# Patient Record
Sex: MALE | Race: WHITE | NOT HISPANIC OR LATINO
[De-identification: names, ages, dates, MRNs, and addresses within clinical notes are randomized per-mention and may not be internally consistent; named-entity substitution may affect disease eponyms.]

---

## 2021-12-16 PROBLEM — Z00.00 ENCOUNTER FOR PREVENTIVE HEALTH EXAMINATION: Status: ACTIVE | Noted: 2021-12-16

## 2022-03-14 ENCOUNTER — APPOINTMENT (OUTPATIENT)
Dept: UROLOGY | Facility: CLINIC | Age: 67
End: 2022-03-14
Payer: MEDICARE

## 2022-03-14 VITALS — HEIGHT: 72 IN | WEIGHT: 136 LBS | BODY MASS INDEX: 18.42 KG/M2

## 2022-03-14 DIAGNOSIS — Z72.89 OTHER PROBLEMS RELATED TO LIFESTYLE: ICD-10-CM

## 2022-03-14 DIAGNOSIS — R82.81 PYURIA: ICD-10-CM

## 2022-03-14 DIAGNOSIS — F17.200 NICOTINE DEPENDENCE, UNSPECIFIED, UNCOMPLICATED: ICD-10-CM

## 2022-03-14 PROCEDURE — 99204 OFFICE O/P NEW MOD 45 MIN: CPT

## 2022-03-14 NOTE — ADDENDUM
[FreeTextEntry1] : Patient's note was transcribed with the assistance of a medical scribe under the supervision of Dr. Brambila.\par I, Dr. Brambila, have reviewed the patient's chart and agree that it aligns with my medical decisions.\par Dontrell Rodas, our scribe, also served as a chaperone for physical examination purposes.\par \par \par

## 2022-03-14 NOTE — HISTORY OF PRESENT ILLNESS
[FreeTextEntry1] : IRASEMA BOYCE is a 66 year old male  who presents for consultation for elevated PSA. \par \par Pt reports he is not urinary symptoms whatsoever.. Although reports nocturia 2-3x. \par Pt reports he has been following his PSA levels with his PCP. \par Denies gross hematuria, dysuria or associated symptoms. \par \par Denies  PMH including previous kidney stones, recurrent UTIs. \par Family History: No  malignancies\par Social History: Current Cigar Smoker. former  with [employer] \par \par Labs: \par 12/2021: \par UA: pyuria // no microhematuria// nitrates=negative \par Cr= 0.94\par eGFR=84\par PSA=7.4 \par \par 5/2021:\par PSA= 6.6 \par \par AUA: 2 \par Quality of life= delighted. \par \par \par

## 2022-03-14 NOTE — ASSESSMENT
[FreeTextEntry1] : IRASEMA BOYCE is a 66 year old male who presents for consultation for elevated PSA. No risk factors.\par \par Plan: \par UCx, UA. reassess pyuria, assess for infection\par MRI prostate \par fu after\par \par

## 2022-03-14 NOTE — LETTER HEADER
[FreeTextEntry3] : Deandre Brambila MD\par Director of Robotic and Minimally Invasive Urologic Surgery\par \par [facility]\par Division of Urology\par [address]\par [address]\[place] [address]\par Tel [phone]\par Fax [phone]\par \par

## 2022-03-14 NOTE — PHYSICAL EXAM
[General Appearance - Well Developed] : well developed [General Appearance - Well Nourished] : well nourished [Normal Appearance] : normal appearance [Well Groomed] : well groomed [General Appearance - In No Acute Distress] : no acute distress [Edema] : no peripheral edema [Respiration, Rhythm And Depth] : normal respiratory rhythm and effort [Exaggerated Use Of Accessory Muscles For Inspiration] : no accessory muscle use [Abdomen Soft] : soft [Abdomen Tenderness] : non-tender [Costovertebral Angle Tenderness] : no ~M costovertebral angle tenderness [Urethral Meatus] : meatus normal [Urinary Bladder Findings] : the bladder was normal on palpation [Scrotum] : the scrotum was normal [Testes Mass (___cm)] : there were no testicular masses [No Prostate Nodules] : no prostate nodules [Prostate Size ___ gm] : prostate size [unfilled] gm [Normal Station and Gait] : the gait and station were normal for the patient's age [] : no rash [No Focal Deficits] : no focal deficits [Oriented To Time, Place, And Person] : oriented to person, place, and time [Affect] : the affect was normal [Mood] : the mood was normal [Not Anxious] : not anxious [No Palpable Adenopathy] : no palpable adenopathy [FreeTextEntry1] : prostate exam normal

## 2022-03-14 NOTE — LETTER BODY
[Dear  ___] : Dear  [unfilled], [FreeTextEntry2] : Dr Jamie Johnson [FreeTextEntry3] : Deandre Brambila MD\par Director of Robotic and Minimally Invasive Urologic Surgery\par [facility]\par

## 2022-04-19 ENCOUNTER — NON-APPOINTMENT (OUTPATIENT)
Age: 67
End: 2022-04-19

## 2022-04-25 ENCOUNTER — APPOINTMENT (OUTPATIENT)
Dept: UROLOGY | Facility: CLINIC | Age: 67
End: 2022-04-25
Payer: MEDICARE

## 2022-04-25 VITALS — HEIGHT: 71 IN | BODY MASS INDEX: 18.9 KG/M2 | WEIGHT: 135 LBS

## 2022-04-25 PROCEDURE — 99214 OFFICE O/P EST MOD 30 MIN: CPT

## 2022-04-25 RX ORDER — SULFAMETHOXAZOLE AND TRIMETHOPRIM 800; 160 MG/1; MG/1
800-160 TABLET ORAL TWICE DAILY
Qty: 14 | Refills: 0 | Status: ACTIVE | COMMUNITY
Start: 2022-04-25 | End: 1900-01-01

## 2022-04-25 NOTE — HISTORY OF PRESENT ILLNESS
[FreeTextEntry1] : IRASEMA BOYCE is a 66 year old male who presents for consultation for elevated PSA. No risk factors.\par \par Pt reports he is doing well overall. Denies any dysuria, gross hematuria or urinary frequency or urgency. \par Reports in the past he has had asymptomatic bacteriuria as per his pcp. \par \par 4/2022: \par UCx= E.Coli\par UA:WBC=20-40 // bacteria=many. \par PSA=6.5\par \par Previously: \par Pt reports he is not urinary symptoms whatsoever.. Although reports nocturia 2-3x. \par Pt reports he has been following his PSA levels with his PCP. \par Denies gross hematuria, dysuria or associated symptoms. \par \par Denies  PMH including previous kidney stones, recurrent UTIs. \par Family History: No  malignancies\par Social History: Current Cigar Smoker. former  with [employer] \par \par Labs: \par 12/2021: \par UA: pyuria // no microhematuria// nitrates=negative \par Cr= 0.94\par eGFR=84\par PSA=7.4 \par \par 5/2021:\par PSA= 6.6 \par \par AUA: 2 \par Quality of life= delighted. \par \par \par

## 2022-04-25 NOTE — ASSESSMENT
[FreeTextEntry1] : IRASEMA BOYCE is a 66 year old male who presents for consultation for elevated PSA. No risk factors.\par With asymptomatic bacteriuria and reported prior hx asx bacteriuria.\par \par Plan: \par Start Bactrim treatment for a week.\par the week after completing the ABx treatment PSA testing.  \par MRI prostate will obtain anyway given pt has had elevated psa at least 1 year\par f/u with the results \par future can obtain renal sono assess cause of bacteriruria, as pt is interested

## 2022-05-24 ENCOUNTER — OUTPATIENT (OUTPATIENT)
Dept: OUTPATIENT SERVICES | Facility: HOSPITAL | Age: 67
LOS: 1 days | Discharge: HOME | End: 2022-05-24
Payer: MEDICARE

## 2022-05-24 DIAGNOSIS — R97.20 ELEVATED PROSTATE SPECIFIC ANTIGEN [PSA]: ICD-10-CM

## 2022-05-24 PROCEDURE — 72197 MRI PELVIS W/O & W/DYE: CPT | Mod: 26

## 2022-05-26 ENCOUNTER — NON-APPOINTMENT (OUTPATIENT)
Age: 67
End: 2022-05-26

## 2022-06-09 ENCOUNTER — APPOINTMENT (OUTPATIENT)
Dept: UROLOGY | Facility: CLINIC | Age: 67
End: 2022-06-09
Payer: MEDICARE

## 2022-06-09 PROCEDURE — 99214 OFFICE O/P EST MOD 30 MIN: CPT

## 2022-06-09 NOTE — ASSESSMENT
[FreeTextEntry1] : IRASEMA BOYCE is a 66 year old male who presents for consultation for elevated PSA. No risk factors.\par With asymptomatic bacteriuria and reported prior hx asx bacteriuria.\par \par Plan: \par Recommended prostate biopsy given the patient has an elevated PSA density however patient prefers to hold off for now.  He will follow-up in 6 weeks with a PSA prior given the PSA has been going down.  If it is not improved we will recommend a biopsy again\par \par

## 2022-06-09 NOTE — HISTORY OF PRESENT ILLNESS
[FreeTextEntry1] : IRASEMA BOYCE is a 66 year old male who presents for consultation for elevated PSA. No risk factors.\par \par Pt reports he is doing well overall. Denies any dysuria, gross hematuria or urinary frequency or urgency. \par Patient states that his urine culture most recently was negative after taking antibiotics.  He reports improvement in his urinary symptoms.\par \par PSA 5.9 Free psa 8%\par PSAD 0.231\par \par MRI prostate images visualized demonstrate no suspicious lesions, PI-RADS 1 and prostate volume 25.5 g\par \par 4/2022: \par UCx= E.Coli\par UA:WBC=20-40 // bacteria=many. \par PSA=6.5\par \par Previously: \par Pt reports he is not urinary symptoms whatsoever.. Although reports nocturia 2-3x. \par Pt reports he has been following his PSA levels with his PCP. \par Denies gross hematuria, dysuria or associated symptoms. \par \par Denies  PMH including previous kidney stones, recurrent UTIs. \par Family History: No  malignancies\par Social History: Current Cigar Smoker. former  with [employer] \par \par Labs: \par 12/2021: \par UA: pyuria // no microhematuria// nitrates=negative \par Cr= 0.94\par eGFR=84\par PSA=7.4 \par \par 5/2021:\par PSA= 6.6 \par \par AUA: 2 \par Quality of life= delighted. \par \par \par

## 2022-07-21 ENCOUNTER — NON-APPOINTMENT (OUTPATIENT)
Age: 67
End: 2022-07-21

## 2022-07-21 ENCOUNTER — APPOINTMENT (OUTPATIENT)
Dept: UROLOGY | Facility: CLINIC | Age: 67
End: 2022-07-21

## 2022-07-21 LAB
BILIRUB UR QL STRIP: NORMAL
CLARITY UR: NORMAL
COLLECTION METHOD: NORMAL
GLUCOSE UR-MCNC: NORMAL
HCG UR QL: 1 EU/DL
HGB UR QL STRIP.AUTO: NORMAL
KETONES UR-MCNC: NORMAL
LEUKOCYTE ESTERASE UR QL STRIP: NORMAL
PH UR STRIP: 7
PROT UR STRIP-MCNC: NORMAL
SP GR UR STRIP: 1.02

## 2022-07-21 PROCEDURE — 99214 OFFICE O/P EST MOD 30 MIN: CPT

## 2022-07-21 PROCEDURE — 81003 URINALYSIS AUTO W/O SCOPE: CPT | Mod: QW

## 2022-07-21 RX ORDER — AMOXICILLIN 500 MG/1
500 CAPSULE ORAL
Qty: 30 | Refills: 0 | Status: ACTIVE | COMMUNITY
Start: 2022-02-03

## 2022-07-21 RX ORDER — CARBIDOPA AND LEVODOPA 25; 250 MG/1; MG/1
25-250 TABLET, ORALLY DISINTEGRATING ORAL
Qty: 90 | Refills: 0 | Status: ACTIVE | COMMUNITY
Start: 2022-04-14

## 2022-07-21 NOTE — PHYSICAL EXAM
[General Appearance - Well Developed] : well developed [General Appearance - Well Nourished] : well nourished [Normal Appearance] : normal appearance [Well Groomed] : well groomed [General Appearance - In No Acute Distress] : no acute distress [Abdomen Soft] : soft [Abdomen Tenderness] : non-tender [Costovertebral Angle Tenderness] : no ~M costovertebral angle tenderness [Scrotum] : the scrotum was normal [Edema] : no peripheral edema [] : no respiratory distress [Respiration, Rhythm And Depth] : normal respiratory rhythm and effort [Exaggerated Use Of Accessory Muscles For Inspiration] : no accessory muscle use [Oriented To Time, Place, And Person] : oriented to person, place, and time [Affect] : the affect was normal [Mood] : the mood was normal [Not Anxious] : not anxious [Normal Station and Gait] : the gait and station were normal for the patient's age [No Focal Deficits] : no focal deficits [No Palpable Adenopathy] : no palpable adenopathy

## 2022-07-22 NOTE — ASSESSMENT
[FreeTextEntry1] : IRASEMA BOYCE is a 66 year old male who presents for f/u of an elevated PSA. No risk factors.\par With asymptomatic bacteriuria - s/p 7 days of bactrim- urine neg for infection now,\par no PVR demostrated\par \par Plan: \par Recommended prostate biopsy given the patient has an elevated PSA density- pt agrees\par will schedule a TP biopsy in the OR \par \par \par The patient understands that PSA is a marker of cancer risk but does not diagnose cancer. The patient also understands that there are a number of benign conditions including UTI, BPH, certain activities and prostatitis that will increase PSA in the absence of cancer. Unfortunately the only way to definitively diagnose cancer is with a prostate biopsy. We discussed the method of performing biopsy (transrectal or transperineal in the office) and the risks (bleeding, infection/sepsis, urinary retention, ED). We perform targeted fusion prostate biopsies in the operating room under anesthesia. \par \par \par

## 2022-07-22 NOTE — HISTORY OF PRESENT ILLNESS
[FreeTextEntry1] : IRASEMA BOYCE is a 66 year old male who presents for f/u of an elevated PSA. No risk factors.\par no FH prostate ca\par \par Pt reports he is doing well overall. Denies any dysuria, gross hematuria or urinary frequency or urgency. \par Patient reports an improvement in LUTS- specifically nocturia since taking bactrim for a + urine culture \par \par urine dip today 7/21/22- neg\par bladder scan today 7/21/22-  36ml\par \par PSA- 7/2022- 6.5  %free 6   PSAD- 0.26\par PSA- 5/2022- 5.9 Free psa 8%  PSAD 0.231\par \par MRI prostate-  5/24/22  images visualized demonstrate no suspicious lesions, PI-RADS 1 and prostate volume 25g\par \par 4/2022: \par UCx= E.Coli\par UA:WBC=20-40 // bacteria=many. \par PSA=6.5\par \par Previously: \par Denies  PMH including previous kidney stones, recurrent UTIs. \par Family History: No  malignancies\par Social History: Current Cigar Smoker. former  with [employer] \par \par Labs: \par 12/2021: \par UA: pyuria // no microhematuria// nitrates=negative \par Cr= 0.94\par eGFR=84\par PSA=7.4 \par \par 5/2021:\par PSA= 6.6 \par \par AUA: 2 \par Quality of life= delighted. \par \par \par

## 2022-08-25 ENCOUNTER — RESULT REVIEW (OUTPATIENT)
Age: 67
End: 2022-08-25

## 2022-08-25 ENCOUNTER — OUTPATIENT (OUTPATIENT)
Dept: OUTPATIENT SERVICES | Facility: HOSPITAL | Age: 67
LOS: 1 days | Discharge: HOME | End: 2022-08-25

## 2022-08-25 VITALS
SYSTOLIC BLOOD PRESSURE: 110 MMHG | HEART RATE: 80 BPM | WEIGHT: 126.99 LBS | OXYGEN SATURATION: 99 % | RESPIRATION RATE: 16 BRPM | HEIGHT: 71 IN | DIASTOLIC BLOOD PRESSURE: 60 MMHG | TEMPERATURE: 98 F

## 2022-08-25 DIAGNOSIS — Z01.818 ENCOUNTER FOR OTHER PREPROCEDURAL EXAMINATION: ICD-10-CM

## 2022-08-25 DIAGNOSIS — R97.20 ELEVATED PROSTATE SPECIFIC ANTIGEN [PSA]: ICD-10-CM

## 2022-08-25 DIAGNOSIS — Z86.69 PERSONAL HISTORY OF OTHER DISEASES OF THE NERVOUS SYSTEM AND SENSE ORGANS: Chronic | ICD-10-CM

## 2022-08-25 LAB
ALBUMIN SERPL ELPH-MCNC: 4.3 G/DL — SIGNIFICANT CHANGE UP (ref 3.5–5.2)
ALP SERPL-CCNC: 65 U/L — SIGNIFICANT CHANGE UP (ref 30–115)
ALT FLD-CCNC: 7 U/L — SIGNIFICANT CHANGE UP (ref 0–41)
ANION GAP SERPL CALC-SCNC: 10 MMOL/L — SIGNIFICANT CHANGE UP (ref 7–14)
APTT BLD: 30 SEC — SIGNIFICANT CHANGE UP (ref 27–39.2)
AST SERPL-CCNC: 14 U/L — SIGNIFICANT CHANGE UP (ref 0–41)
BASOPHILS # BLD AUTO: 0.03 K/UL — SIGNIFICANT CHANGE UP (ref 0–0.2)
BASOPHILS NFR BLD AUTO: 0.4 % — SIGNIFICANT CHANGE UP (ref 0–1)
BILIRUB SERPL-MCNC: 1.2 MG/DL — SIGNIFICANT CHANGE UP (ref 0.2–1.2)
BUN SERPL-MCNC: 29 MG/DL — HIGH (ref 10–20)
CALCIUM SERPL-MCNC: 9.3 MG/DL — SIGNIFICANT CHANGE UP (ref 8.5–10.1)
CHLORIDE SERPL-SCNC: 102 MMOL/L — SIGNIFICANT CHANGE UP (ref 98–110)
CO2 SERPL-SCNC: 30 MMOL/L — SIGNIFICANT CHANGE UP (ref 17–32)
CREAT SERPL-MCNC: 1.3 MG/DL — SIGNIFICANT CHANGE UP (ref 0.7–1.5)
EGFR: 60 ML/MIN/1.73M2 — SIGNIFICANT CHANGE UP
EOSINOPHIL # BLD AUTO: 0.07 K/UL — SIGNIFICANT CHANGE UP (ref 0–0.7)
EOSINOPHIL NFR BLD AUTO: 0.9 % — SIGNIFICANT CHANGE UP (ref 0–8)
GLUCOSE SERPL-MCNC: 101 MG/DL — HIGH (ref 70–99)
HCT VFR BLD CALC: 37.5 % — LOW (ref 42–52)
HGB BLD-MCNC: 12 G/DL — LOW (ref 14–18)
IMM GRANULOCYTES NFR BLD AUTO: 0.3 % — SIGNIFICANT CHANGE UP (ref 0.1–0.3)
INR BLD: 1.09 RATIO — SIGNIFICANT CHANGE UP (ref 0.65–1.3)
LYMPHOCYTES # BLD AUTO: 1.03 K/UL — LOW (ref 1.2–3.4)
LYMPHOCYTES # BLD AUTO: 13.4 % — LOW (ref 20.5–51.1)
MCHC RBC-ENTMCNC: 23.3 PG — LOW (ref 27–31)
MCHC RBC-ENTMCNC: 32 G/DL — SIGNIFICANT CHANGE UP (ref 32–37)
MCV RBC AUTO: 72.7 FL — LOW (ref 80–94)
MONOCYTES # BLD AUTO: 0.8 K/UL — HIGH (ref 0.1–0.6)
MONOCYTES NFR BLD AUTO: 10.4 % — HIGH (ref 1.7–9.3)
NEUTROPHILS # BLD AUTO: 5.72 K/UL — SIGNIFICANT CHANGE UP (ref 1.4–6.5)
NEUTROPHILS NFR BLD AUTO: 74.6 % — SIGNIFICANT CHANGE UP (ref 42.2–75.2)
NRBC # BLD: 0 /100 WBCS — SIGNIFICANT CHANGE UP (ref 0–0)
PLATELET # BLD AUTO: 204 K/UL — SIGNIFICANT CHANGE UP (ref 130–400)
POTASSIUM SERPL-MCNC: 4.6 MMOL/L — SIGNIFICANT CHANGE UP (ref 3.5–5)
POTASSIUM SERPL-SCNC: 4.6 MMOL/L — SIGNIFICANT CHANGE UP (ref 3.5–5)
PROT SERPL-MCNC: 6.3 G/DL — SIGNIFICANT CHANGE UP (ref 6–8)
PROTHROM AB SERPL-ACNC: 12.5 SEC — SIGNIFICANT CHANGE UP (ref 9.95–12.87)
RBC # BLD: 5.16 M/UL — SIGNIFICANT CHANGE UP (ref 4.7–6.1)
RBC # FLD: 13.7 % — SIGNIFICANT CHANGE UP (ref 11.5–14.5)
SODIUM SERPL-SCNC: 142 MMOL/L — SIGNIFICANT CHANGE UP (ref 135–146)
WBC # BLD: 7.67 K/UL — SIGNIFICANT CHANGE UP (ref 4.8–10.8)
WBC # FLD AUTO: 7.67 K/UL — SIGNIFICANT CHANGE UP (ref 4.8–10.8)

## 2022-08-25 PROCEDURE — 71046 X-RAY EXAM CHEST 2 VIEWS: CPT | Mod: 26

## 2022-08-25 PROCEDURE — 93010 ELECTROCARDIOGRAM REPORT: CPT

## 2022-08-25 NOTE — H&P PST ADULT - NSANTHOSAYNRD_GEN_A_CORE
No. LESTER screening performed.  STOP BANG Legend: 0-2 = LOW Risk; 3-4 = INTERMEDIATE Risk; 5-8 = HIGH Risk

## 2022-08-25 NOTE — H&P PST ADULT - ADDITIONAL PE
tremors -LT hand when writing, kyphosis, LTD ext neck, B/L LE -hyperpigmented scars - B/L LE -atrophy

## 2022-08-25 NOTE — H&P PST ADULT - NSICDXPASTMEDICALHX_GEN_ALL_CORE_FT
PAST MEDICAL HISTORY:  Dizziness with nausea -2/3 times/yr    Mini stroke winter 2021    Tremor arm- LT hand and shaking of  head -laying down at night    Vertigo

## 2022-08-25 NOTE — H&P PST ADULT - HISTORY OF PRESENT ILLNESS
67yr old male reports freq urination -PSA elevated from prior -is scheduled for TRANSPERINEAL PROSTATE BIOPSY. Denies COVID S/S. Recd 3 doses vaccine. Verbalized understanding of COVID prevention measures. Exercise collin 5-6 blocks.  Anesthesia Alert  NO--Difficult Airway  NO--History of neck surgery or radiation  YES--Limited ROM of neck  NO--History of Malignant hyperthermia  NO--Personal or family history of Pseudocholinesterase deficiency  NO--Prior Anesthesia Complication  NO--Latex Allergy  NO--Loose teeth  NO--History of Rheumatoid Arthritis  NO--LESTER  No Bleeding risk  NO--Other_____

## 2022-08-26 LAB
APPEARANCE UR: CLEAR — SIGNIFICANT CHANGE UP
BACTERIA # UR AUTO: NEGATIVE — SIGNIFICANT CHANGE UP
BILIRUB UR-MCNC: ABNORMAL
COLOR SPEC: YELLOW — SIGNIFICANT CHANGE UP
CULTURE RESULTS: SIGNIFICANT CHANGE UP
DIFF PNL FLD: NEGATIVE — SIGNIFICANT CHANGE UP
EPI CELLS # UR: 0 /HPF — SIGNIFICANT CHANGE UP (ref 0–5)
GLUCOSE UR QL: SIGNIFICANT CHANGE UP
HYALINE CASTS # UR AUTO: 0 /LPF — SIGNIFICANT CHANGE UP (ref 0–7)
KETONES UR-MCNC: SIGNIFICANT CHANGE UP
LEUKOCYTE ESTERASE UR-ACNC: NEGATIVE — SIGNIFICANT CHANGE UP
NITRITE UR-MCNC: NEGATIVE — SIGNIFICANT CHANGE UP
PH UR: 6 — SIGNIFICANT CHANGE UP (ref 5–8)
PROT UR-MCNC: ABNORMAL
RBC CASTS # UR COMP ASSIST: 3 /HPF — SIGNIFICANT CHANGE UP (ref 0–4)
SP GR SPEC: 1.03 — SIGNIFICANT CHANGE UP (ref 1.01–1.03)
SPECIMEN SOURCE: SIGNIFICANT CHANGE UP
UROBILINOGEN FLD QL: ABNORMAL
WBC UR QL: 1 /HPF — SIGNIFICANT CHANGE UP (ref 0–5)

## 2022-09-01 ENCOUNTER — NON-APPOINTMENT (OUTPATIENT)
Age: 67
End: 2022-09-01

## 2022-09-04 ENCOUNTER — LABORATORY RESULT (OUTPATIENT)
Age: 67
End: 2022-09-04

## 2022-09-07 ENCOUNTER — APPOINTMENT (OUTPATIENT)
Dept: UROLOGY | Facility: HOSPITAL | Age: 67
End: 2022-09-07

## 2022-09-07 ENCOUNTER — TRANSCRIPTION ENCOUNTER (OUTPATIENT)
Age: 67
End: 2022-09-07

## 2022-09-07 ENCOUNTER — OUTPATIENT (OUTPATIENT)
Dept: OUTPATIENT SERVICES | Facility: HOSPITAL | Age: 67
LOS: 1 days | Discharge: HOME | End: 2022-09-07

## 2022-09-07 ENCOUNTER — RESULT REVIEW (OUTPATIENT)
Age: 67
End: 2022-09-07

## 2022-09-07 VITALS
WEIGHT: 130.07 LBS | SYSTOLIC BLOOD PRESSURE: 120 MMHG | RESPIRATION RATE: 17 BRPM | DIASTOLIC BLOOD PRESSURE: 56 MMHG | HEART RATE: 80 BPM | TEMPERATURE: 97 F | HEIGHT: 71 IN | OXYGEN SATURATION: 99 %

## 2022-09-07 VITALS — RESPIRATION RATE: 17 BRPM | SYSTOLIC BLOOD PRESSURE: 122 MMHG | HEART RATE: 65 BPM | DIASTOLIC BLOOD PRESSURE: 57 MMHG

## 2022-09-07 DIAGNOSIS — Z86.69 PERSONAL HISTORY OF OTHER DISEASES OF THE NERVOUS SYSTEM AND SENSE ORGANS: Chronic | ICD-10-CM

## 2022-09-07 PROCEDURE — 93975 VASCULAR STUDY: CPT | Mod: 26

## 2022-09-07 PROCEDURE — 55700: CPT

## 2022-09-07 PROCEDURE — 45990 SURG DX EXAM ANORECTAL: CPT

## 2022-09-07 PROCEDURE — 76872 US TRANSRECTAL: CPT | Mod: 26

## 2022-09-07 PROCEDURE — G0416: CPT | Mod: 26

## 2022-09-07 RX ORDER — OXYCODONE AND ACETAMINOPHEN 5; 325 MG/1; MG/1
1 TABLET ORAL EVERY 4 HOURS
Refills: 0 | Status: DISCONTINUED | OUTPATIENT
Start: 2022-09-07 | End: 2022-09-07

## 2022-09-07 RX ORDER — ONDANSETRON 8 MG/1
4 TABLET, FILM COATED ORAL ONCE
Refills: 0 | Status: DISCONTINUED | OUTPATIENT
Start: 2022-09-07 | End: 2022-09-21

## 2022-09-07 RX ORDER — ASPIRIN/CALCIUM CARB/MAGNESIUM 324 MG
1 TABLET ORAL
Qty: 0 | Refills: 0 | DISCHARGE

## 2022-09-07 RX ORDER — SODIUM CHLORIDE 9 MG/ML
1000 INJECTION, SOLUTION INTRAVENOUS
Refills: 0 | Status: DISCONTINUED | OUTPATIENT
Start: 2022-09-07 | End: 2022-09-21

## 2022-09-07 RX ORDER — ATORVASTATIN CALCIUM 80 MG/1
1 TABLET, FILM COATED ORAL
Qty: 0 | Refills: 0 | DISCHARGE

## 2022-09-07 RX ORDER — CARBIDOPA AND LEVODOPA 25; 100 MG/1; MG/1
1 TABLET ORAL
Qty: 0 | Refills: 0 | DISCHARGE

## 2022-09-07 NOTE — CHART NOTE - NSCHARTNOTEFT_GEN_A_CORE
PACU ANESTHESIA ADMISSION NOTE      Procedure: Prostate needle biopsy      Post op diagnosis:      ____  Intubated  TV:______       Rate: ______      FiO2: ______    _x___  Patent Airway    __x__  Full return of protective reflexes    __x__  Full recovery from anesthesia / back to baseline     Vitals:   T: 97-9          R:  16                BP:  112/76                Sat: 99                  P: 65      Mental Status:  x____ Awake   x_____ Alert   _____ Drowsy   _____ Sedated    Nausea/Vomiting:  ____ NO  ______Yes,   See Post - Op Orders          Pain Scale (0-10):  _____    Treatment: ____ None    ____ See Post - Op/PCA Orders    Post - Operative Fluids:   ____ Oral   ____ See Post - Op Orders    Plan: Discharge:  x ____Home       _____Floor     _____Critical Care    _____  Other:_________________    Comments:

## 2022-09-07 NOTE — ASU DISCHARGE PLAN (ADULT/PEDIATRIC) - ASU DC SPECIAL INSTRUCTIONSFT
Today you underwent a transperineal prostate biopsy. Blood in the urine, stool, and ejaculate is possible for weeks. If you have a fever >100.4 degrees or if you are unable to urinate, you should visit the emergency room immediately. Dr Brambila's office will call you for a follow up appointment in 2 weeks.   Please apply the provided bacitracin for on the perineum, twice per day until the tube runs out.  The dressing can come off tomorrow.   You can use tylenol and/or ibuprofen over the counter as needed for pain.

## 2022-09-07 NOTE — ASU PATIENT PROFILE, ADULT - NS PRO AD ANY ON CHART
Insurance Authorization for admission:   Phone call placed to [address]  Phone number: [phone]     Spoke to Oral K     4 days approved  Level of care: IP  Review on TBD  Authorization # pending admission     EVS (Eligibility Verification System) called - [phone]  Automated system indicates: Eligible for [employer] for Transportation:    Phone call placed to **  Phone number **  Spoke to **     Authorization #: ** No

## 2022-09-07 NOTE — ASU PATIENT PROFILE, ADULT - FALL HARM RISK - UNIVERSAL INTERVENTIONS
Bed in lowest position, wheels locked, appropriate side rails in place/Call bell, personal items and telephone in reach/Instruct patient to call for assistance before getting out of bed or chair/Non-slip footwear when patient is out of bed/[place] to call system/Physically safe environment - no spills, clutter or unnecessary equipment/Purposeful Proactive Rounding/Room/bathroom lighting operational, light cord in reach

## 2022-09-07 NOTE — ASU PATIENT PROFILE, ADULT - FALL HARM RISK - ATTEMPT OOB
To be assessed. Attempted with 2 person assistance however pt. unable to perform secondary to LE weakness.
No

## 2022-09-09 PROBLEM — R25.1 TREMOR, UNSPECIFIED: Chronic | Status: ACTIVE | Noted: 2022-08-25

## 2022-09-09 PROBLEM — R42 DIZZINESS AND GIDDINESS: Chronic | Status: ACTIVE | Noted: 2022-08-25

## 2022-09-09 PROBLEM — G45.9 TRANSIENT CEREBRAL ISCHEMIC ATTACK, UNSPECIFIED: Chronic | Status: ACTIVE | Noted: 2022-08-25

## 2022-09-12 LAB — SURGICAL PATHOLOGY STUDY: SIGNIFICANT CHANGE UP

## 2022-09-14 DIAGNOSIS — R97.20 ELEVATED PROSTATE SPECIFIC ANTIGEN [PSA]: ICD-10-CM

## 2022-09-14 DIAGNOSIS — N41.1 CHRONIC PROSTATITIS: ICD-10-CM

## 2022-09-14 DIAGNOSIS — Z79.82 LONG TERM (CURRENT) USE OF ASPIRIN: ICD-10-CM

## 2022-09-14 DIAGNOSIS — N40.0 BENIGN PROSTATIC HYPERPLASIA WITHOUT LOWER URINARY TRACT SYMPTOMS: ICD-10-CM

## 2022-09-14 DIAGNOSIS — F17.290 NICOTINE DEPENDENCE, OTHER TOBACCO PRODUCT, UNCOMPLICATED: ICD-10-CM

## 2022-09-14 DIAGNOSIS — C61 MALIGNANT NEOPLASM OF PROSTATE: ICD-10-CM

## 2022-09-16 ENCOUNTER — NON-APPOINTMENT (OUTPATIENT)
Age: 67
End: 2022-09-16

## 2022-09-22 ENCOUNTER — APPOINTMENT (OUTPATIENT)
Dept: UROLOGY | Facility: CLINIC | Age: 67
End: 2022-09-22

## 2022-09-22 VITALS
WEIGHT: 135 LBS | HEART RATE: 75 BPM | SYSTOLIC BLOOD PRESSURE: 94 MMHG | RESPIRATION RATE: 16 BRPM | HEIGHT: 71 IN | BODY MASS INDEX: 18.9 KG/M2 | DIASTOLIC BLOOD PRESSURE: 51 MMHG

## 2022-09-22 PROCEDURE — 99214 OFFICE O/P EST MOD 30 MIN: CPT

## 2022-09-22 NOTE — ADDENDUM
[FreeTextEntry1] : Patient's note was transcribed with the assistance of a medical scribe under the supervision of Dr. Brambila.\par I, Dr. Brambila, have reviewed the patient's chart and agree that it aligns with my medical decisions.\par Maura Ramos, our scribe, also served as a chaperone for physical examination purposes.\par \par \par

## 2022-09-22 NOTE — ASSESSMENT
[FreeTextEntry1] : IRASEMA BOYCE is a 67 year old male who presents for f/u of an elevated PSA. No risk factors.\par With asymptomatic bacteriuria - s/p 7 days of bactrim- urine neg for infection now,no PVR demonstrated w persistently elevated PSA and MRI negative w high PSAD sp TP prostate biopsy (non-targeted) \par Pathology Sariah 3+3 prostate cancer 3 of 12 cores positive up to 90% of core\par \par We discussed various options and patient elects to start active surveillance.  Declined surgery or radiation.  I also offered patient a second opinion and we printed out the pathology.\par \par \par The natural history of this disease was explained to the patient at length and the treatment options discussed including radical prostatectomy by open or robotic-assisted laparoscopic approach, external-beam radiotherapy, brachytherapy, and watchful waiting, including the probability of success and complications associated with these approaches.\par \par With respect to AS/watchful waiting, we discussed the difficulties of estimating the extent of disease preoperatively, the risk of cancer progression, and risk that salvage might not be possible with progression. \par  However, the favorable long-term outcomes of active surveillance in appropriately selected patients was conveyed.\par

## 2022-09-22 NOTE — HISTORY OF PRESENT ILLNESS
[FreeTextEntry1] : IRASEMA BOYCE is a 67 year old male who presents for f/u of an elevated PSA. No risk factors.\par With asymptomatic bacteriuria - s/p 7 days of bactrim- urine neg for infection now,no PVR demonstrated.\par \par Pt reports feeling fine after the biopsy . Notes no blood in stool or gross hematuria or dysuria . Denies fever and chills but c/o hematospermia. \par \par pathology 09/07/22 - 3/12 cores positive for adenocarcinoma , Sariah score 6 , grade group 1 , involving up to \par 90% of the core \par \par Previously \par urine dip today 7/21/22- neg\par bladder scan today 7/21/22-  36ml\par \par PSA- 7/2022- 6.5  %free 6   PSAD- 0.26\par PSA- 5/2022- 5.9 Free psa 8%  PSAD 0.231\par \par MRI prostate-  5/24/22  images demonstrate no suspicious lesions, PI-RADS 1 and prostate volume 25g\par \par 4/2022: \par UCx= E.Coli\par UA:WBC=20-40 // bacteria=many. \par PSA=6.5\par \par Previously: \par Denies  PMH including previous kidney stones, recurrent UTIs. \par Family History: No  malignancies\par Social History: Current Cigar Smoker. former  with [employer] \par \par Labs: \par 12/2021: \par UA: pyuria // no microhematuria// nitrates=negative \par Cr= 0.94\par eGFR=84\par PSA=7.4 \par \par 5/2021:\par PSA= 6.6 \par \par AUA: 2 \par Quality of life= delighted. \par \par \par

## 2022-11-17 ENCOUNTER — APPOINTMENT (OUTPATIENT)
Dept: NEUROLOGY | Facility: CLINIC | Age: 67
End: 2022-11-17

## 2022-11-17 VITALS
BODY MASS INDEX: 18.9 KG/M2 | HEART RATE: 77 BPM | WEIGHT: 135 LBS | DIASTOLIC BLOOD PRESSURE: 77 MMHG | SYSTOLIC BLOOD PRESSURE: 119 MMHG | HEIGHT: 71 IN

## 2022-11-17 PROCEDURE — 99213 OFFICE O/P EST LOW 20 MIN: CPT

## 2022-11-18 NOTE — HISTORY OF PRESENT ILLNESS
[FreeTextEntry1] : Mr. Solomon presents today as a 67 year old man for neurological follow-up. 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 He denies any new or evolving symptoms. This has been going on for approximately 3 years. Tremor do not occur all the time, only occurs when he is writing. His handwriting is legible and is not very bothersome. He denies any gait issues or cognitive issues. Since last encounter, patient continues to have head twitching that mostly occurs at night. He denies it being voluntary or an urge to do. \par \par Since last encounter, patient reports trialing Carbidopa levodopa which offered 50% improvement with regards to the head twitching though no improvement of hand tremors. He takes the medication 2x a day. He denies any side effects to medication.

## 2022-11-18 NOTE — ASSESSMENT
[FreeTextEntry1] : Mr. Solomon presents today as a 67 year old man for neurological follow-up. 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 Since last encounter, patient reports trialing Carbidopa levodopa which offered 50% improvement with regards to the head twitching. He wishes to cut down to once a day which I advised is okay, though if symptoms worsen he may return to twice a day. In regards to hand tremors patient will see a movement disorder specialist. \par \par Dale BARNES, attest that this documentation has been prepared under the direction and in the presence of Provider Rosaline Felipe \par \par Thank you for allowing me to assist in the management of this patient.\par \par \par Bret Fletcher DO\bernadine Board Certified, Neurology

## 2023-03-20 ENCOUNTER — APPOINTMENT (OUTPATIENT)
Dept: UROLOGY | Facility: CLINIC | Age: 68
End: 2023-03-20
Payer: MEDICARE

## 2023-03-20 VITALS
SYSTOLIC BLOOD PRESSURE: 120 MMHG | HEART RATE: 70 BPM | HEIGHT: 71 IN | DIASTOLIC BLOOD PRESSURE: 80 MMHG | WEIGHT: 135 LBS | BODY MASS INDEX: 18.9 KG/M2

## 2023-03-20 PROCEDURE — 99213 OFFICE O/P EST LOW 20 MIN: CPT

## 2023-03-20 NOTE — ASSESSMENT
[FreeTextEntry1] : IRASEMA BOYCE is a 67 year old male who presents for f/u of an elevated PSA. No risk factors.\par With asymptomatic bacteriuria - s/p 7 days of bactrim- urine neg for infection now,no PVR demonstrated w persistently elevated PSA and MRI negative w high PSAD sp TP prostate biopsy (non-targeted) \par Pathology Sariah 3+3 prostate cancer 3 of 12 cores positive up to 90% of core\par \par PSA stable\par Patient elects to continue active surveillance versus definitive management w surg or xrt\par \par Plan \par - RTO in 6 months with PSA. \par JEANNE at next visit \par MRI 12 mo

## 2023-03-20 NOTE — HISTORY OF PRESENT ILLNESS
[FreeTextEntry1] : IRASEMA BOYCE is a 67 year old male who presents for f/u of an elevated PSA. No risk factors.\par With asymptomatic bacteriuria - s/p 7 days of bactrim- urine neg for infection now,no PVR demonstrated w persistently elevated PSA and MRI negative w high PSAD sp TP prostate biopsy (non-targeted) 09/22\par Pathology:Marcus 3+3 prostate cancer 3 of 12 cores positive up to 90% of core.\par \par Pt has had stable LUTS following the biopsy and reports no significant changes. He reports strong stream and nocturia 0-1x. He denies any gross hematuria , dysuria or associated symptoms.  \par PSA 03/14/23 - 6.8 \par \par previously \par pathology 09/07/22 - 3/12 cores positive for adenocarcinoma , Marcus score 6 , grade group 1 , involving up to \par 90% of the core \par \par urine dip today 7/21/22- neg\par bladder scan today 7/21/22-  36ml\par \par PSA- 7/2022- 6.5  %free 6   PSAD- 0.26\par PSA- 5/2022- 5.9 Free psa 8%  PSAD 0.231\par \par MRI prostate-  5/24/22  images demonstrate no suspicious lesions, PI-RADS 1 and prostate volume 25g\par \par 4/2022: \par UCx= E.Coli\par UA:WBC=20-40 // bacteria=many. \par PSA=6.5\par \par Previously: \par Denies  PMH including previous kidney stones, recurrent UTIs. \par Family History: No  malignancies\par Social History: Current Cigar Smoker. former  with [employer] \par \par Labs: \par 12/2021: \par UA: pyuria // no microhematuria// nitrates=negative \par Cr= 0.94\par eGFR=84\par PSA=7.4 \par \par 5/2021:\par PSA= 6.6 \par \par AUA: 2 \par Quality of life= delighted. \par \par \par

## 2023-09-21 ENCOUNTER — APPOINTMENT (OUTPATIENT)
Dept: UROLOGY | Facility: CLINIC | Age: 68
End: 2023-09-21
Payer: MEDICARE

## 2023-09-21 VITALS
HEART RATE: 96 BPM | WEIGHT: 135 LBS | HEIGHT: 71 IN | SYSTOLIC BLOOD PRESSURE: 118 MMHG | DIASTOLIC BLOOD PRESSURE: 66 MMHG | BODY MASS INDEX: 18.9 KG/M2

## 2023-09-21 DIAGNOSIS — R97.20 ELEVATED PROSTATE, SPECIFIC ANTIGEN [PSA]: ICD-10-CM

## 2023-09-21 PROCEDURE — 99214 OFFICE O/P EST MOD 30 MIN: CPT

## 2023-09-22 PROBLEM — R97.20 ELEVATED PROSTATE SPECIFIC ANTIGEN (PSA): Status: ACTIVE | Noted: 2022-03-14

## 2024-02-27 ENCOUNTER — APPOINTMENT (OUTPATIENT)
Dept: NEUROLOGY | Facility: CLINIC | Age: 69
End: 2024-02-27
Payer: MEDICARE

## 2024-02-27 DIAGNOSIS — G25.0 ESSENTIAL TREMOR: ICD-10-CM

## 2024-02-27 DIAGNOSIS — R42 DIZZINESS AND GIDDINESS: ICD-10-CM

## 2024-02-27 PROCEDURE — 99213 OFFICE O/P EST LOW 20 MIN: CPT

## 2024-02-27 RX ORDER — ATORVASTATIN CALCIUM 10 MG/1
10 TABLET, FILM COATED ORAL
Qty: 90 | Refills: 3 | Status: ACTIVE | COMMUNITY
Start: 2022-06-06 | End: 1900-01-01

## 2024-02-27 RX ORDER — CARBIDOPA AND LEVODOPA 25; 250 MG/1; MG/1
25-250 TABLET ORAL 3 TIMES DAILY
Qty: 270 | Refills: 3 | Status: ACTIVE | COMMUNITY
Start: 2024-02-27 | End: 1900-01-01

## 2024-02-27 RX ORDER — ASPIRIN 81 MG/1
81 TABLET, CHEWABLE ORAL DAILY
Qty: 90 | Refills: 3 | Status: ACTIVE | COMMUNITY
Start: 2022-05-02 | End: 1900-01-01

## 2024-02-29 PROBLEM — G25.0 ESSENTIAL TREMOR: Status: ACTIVE | Noted: 2022-11-17

## 2024-02-29 NOTE — PHYSICAL EXAM
[Place] : oriented to place [Person] : oriented to person [Concentration Intact] : normal concentrating ability [Time] : oriented to time [Naming Objects] : no difficulty naming common objects [Visual Intact] : visual attention was ~T not ~L decreased [Repeating Phrases] : no difficulty repeating a phrase [Fluency] : fluency intact [Writing A Sentence] : no difficulty writing a sentence [Reading] : reading intact [Comprehension] : comprehension intact [Past History] : adequate knowledge of personal past history [Cranial Nerves Optic (II)] : visual acuity intact bilaterally,  visual fields full to confrontation, pupils equal round and reactive to light [Cranial Nerves Trigeminal (V)] : facial sensation intact symmetrically [Cranial Nerves Oculomotor (III)] : extraocular motion intact [Cranial Nerves Vestibulocochlear (VIII)] : hearing was intact bilaterally [Cranial Nerves Facial (VII)] : face symmetrical [Cranial Nerves Glossopharyngeal (IX)] : tongue and palate midline [Cranial Nerves Hypoglossal (XII)] : there was no tongue deviation with protrusion [Cranial Nerves Accessory (XI - Cranial And Spinal)] : head turning and shoulder shrug symmetric [Motor Tone] : muscle tone was normal in all four extremities [Motor Strength] : muscle strength was normal in all four extremities [No Muscle Atrophy] : normal bulk in all four extremities [Sensation Tactile Decrease] : light touch was intact [Balance] : balance was intact [Abnormal Walk] : normal gait [2+] : Ankle jerk left 2+ [Past-pointing] : there was no past-pointing [Plantar Reflex Right Only] : normal on the right [Tremor] : no tremor present [Plantar Reflex Left Only] : normal on the left

## 2024-02-29 NOTE — REASON FOR VISIT
SUBJECTIVE:   Sabi Corado is a 40 year old female presenting with a chief complaint of   Chief Complaint   Patient presents with    Rectal/perineal Pain     Bump 5 days ago, seems like skin tissue is there now, having chills and fever since yesterday, unsure if getting a cold/virus also at the same time or is it from the rectal problem? After further discussion she has been exposed to COVID at work      5 days ago: Started as a 'ball' and painful, 3 days of no more pain but is bleeding- red and no odor  -next to rectum  -is constantly bleeding  -was painful to sit and walk 5 days ago, now no pain  -no pain with bowel movements  -tried: ibuprofen, warm baths    Fever started yesterday and is worse today, congestion, dry cough  -tried: Nyquil, Dayquil  -no covid tests at home    Main concern is the fever    Patient denies lightheadedness and dizziness. Reports having history of hemoglobin around 8-9 after giving birth but has not seen a PCP in 3 years to monitor anemia. Does not take iron.    She is a new patient of [facility].      Review of Systems   Constitutional:  Positive for chills and fever.   HENT:  Positive for congestion. Negative for ear pain and sore throat.    Eyes:  Positive for pain.   Respiratory:  Positive for cough. Negative for shortness of breath.    Cardiovascular:  Negative for chest pain.   Gastrointestinal:  Positive for anal bleeding and nausea. Negative for abdominal pain, blood in stool, constipation, diarrhea and vomiting.   Genitourinary:  Negative for dysuria.   Skin:  Negative for rash.   Neurological:  Negative for numbness and headaches.       History reviewed. No pertinent past medical history.  History reviewed. No pertinent family history.  Current Outpatient Medications   Medication Sig Dispense Refill    hydrocortisone, Perianal, (HYDROCORTISONE) 2.5 % cream Place rectally 2 times daily as needed for hemorrhoids 28 g 0     Social History     Tobacco Use    Smoking  status: Never    Smokeless tobacco: Never   Substance Use Topics    Alcohol use: Not on file       OBJECTIVE  /63 (BP Location: Left arm, Patient Position: Sitting, Cuff Size: Adult Large)   Pulse 101   Temp (!) 102.6  F (39.2  C) (Tympanic)   Wt 65.3 kg (144 lb)   LMP 09/17/2023 (Approximate)   SpO2 98%   BMI 25.51 kg/m      Physical Exam  Vitals and nursing note reviewed.   Constitutional:       General: She is not in acute distress.     Appearance: She is normal weight. She is ill-appearing. She is not toxic-appearing or diaphoretic.   HENT:      Head: Normocephalic and atraumatic.      Right Ear: Tympanic membrane, ear canal and external ear normal.      Left Ear: Tympanic membrane, ear canal and external ear normal.      Mouth/Throat:      Mouth: Mucous membranes are moist.      Pharynx: Oropharynx is clear. No oropharyngeal exudate or posterior oropharyngeal erythema.   Eyes:      Conjunctiva/sclera: Conjunctivae normal.   Cardiovascular:      Rate and Rhythm: Regular rhythm. Tachycardia present.      Heart sounds: Normal heart sounds.   Pulmonary:      Effort: Pulmonary effort is normal.      Breath sounds: Normal breath sounds.   Genitourinary:     Comments: External hemorrhoid on 3oclock position if anus is clock and 12oclock is towards bellybutton  Musculoskeletal:      Cervical back: Normal range of motion.   Skin:     General: Skin is warm and dry.   Neurological:      General: No focal deficit present.      Mental Status: She is alert and oriented to person, place, and time. Mental status is at baseline.   Psychiatric:         Mood and Affect: Mood normal.         Behavior: Behavior normal.         Thought Content: Thought content normal.         Judgment: Judgment normal.         Labs:  Results for orders placed or performed in visit on 09/28/23 (from the past 24 hour(s))   CBC with platelets and differential    Narrative    The following orders were created for panel order CBC with  platelets and differential.  Procedure                               Abnormality         Status                     ---------                               -----------         ------                     CBC with platelets and d...[[id number]]  Abnormal            Final result                 Please view results for these tests on the individual orders.   CBC with platelets and differential   Result Value Ref Range    WBC Count 5.8 4.0 - 11.0 10e3/uL    RBC Count 4.10 3.80 - 5.20 10e6/uL    Hemoglobin 7.6 (LL) 11.7 - 15.7 g/dL    Hematocrit 27.1 (L) 35.0 - 47.0 %    MCV 66 (L) 78 - 100 fL    MCH 18.5 (L) 26.5 - 33.0 pg    MCHC 28.0 (L) 31.5 - 36.5 g/dL    RDW 19.1 (H) 10.0 - 15.0 %    Platelet Count 282 150 - 450 10e3/uL    % Neutrophils 74 %    % Lymphocytes 12 %    % Monocytes 14 %    % Eosinophils 0 %    % Basophils 0 %    % Immature Granulocytes 0 %    Absolute Neutrophils 4.3 1.6 - 8.3 10e3/uL    Absolute Lymphocytes 0.7 (L) 0.8 - 5.3 10e3/uL    Absolute Monocytes 0.8 0.0 - 1.3 10e3/uL    Absolute Eosinophils 0.0 0.0 - 0.7 10e3/uL    Absolute Basophils 0.0 0.0 - 0.2 10e3/uL    Absolute Immature Granulocytes 0.0 <=0.4 10e3/uL       X-Ray was not done.    ASSESSMENT:      ICD-10-CM    1. External hemorrhoids  K64.4 Adult GI  Referral - Consult Only     hydrocortisone, Perianal, (HYDROCORTISONE) 2.5 % cream      2. Fever, unspecified fever cause  R50.9 CBC with platelets and differential     CBC with platelets and differential      3. Anemia, unspecified type  D64.9            Medical Decision Making:    Differential Diagnosis:  URI Adult/Peds:  Bronchitis-viral, Influenza, Pneumonia, Sinusitis, Viral syndrome, and Viral upper respiratory illness, covid    Anal bleeding: hemorrhoid, anal fissure, abscess    Serious Comorbid Conditions:  Adult:  None    PLAN:    Hemorrhoid: Prescribed hydrocortisone. Made GI referral. Reassured that the bleeding should stop soon. Recommended fiber and hydration    Anemia:  CBC ordered to evaluate for infectious causes of fever, incidental finding of hemoglobin at 7.6. Patient denies dizziness and lightheadedness but does have a fever. No CBC for comparison of her baseline hemoglobin and due to patient's fever, discussed options of going to ED for potential transfusion or following up with PCP within a week. Patient elected to go to ED at this time.     Fever: Pending Covid test. CBC normal except for low hemoglobin, no bacterial infection is etiology for fever.     Followup:    If not improving or if condition worsens, follow up with your Primary Care Provider, If not improving or if conditions worsens over the next 12-24 hours, go to the Emergency Department    There are no Patient Instructions on file for this visit.    TANISHA Perkins Student      [Follow-Up: _____] : a [unfilled] follow-up visit [FreeTextEntry1] : follow up Mini Stroke.

## 2024-02-29 NOTE — ASSESSMENT
[FreeTextEntry1] : Tremor/past specific dystonia - Response to carbidopa levodopa, medication refill  History of CVA - Continue with aspirin/atorvastatin for secondary stroke prevention   I, Keli Flores, Attest that this documentation has been prepared under the direction and in the presence of Provider Bret Fletcher DO  Thank You for letting me assist in the management of this patient.   Bret Fletcher DO Board Certified, Neurology

## 2024-02-29 NOTE — HISTORY OF PRESENT ILLNESS
[FreeTextEntry1] : Mr. IRASEMA SOLOMON returns for a follow up. His prior history, physical have been reviewed. He reports no change since last visit. He has been seeing us for Dystocia in the left hand which he was last seen 11/17/2022. Since then, the tremors have significantly improved. He has treated with the help of Carbidopa Levodopa. He has treated stroke prevention with the help of medication. There is no head twitching throughout the day. He is only symptomatic when lying in bed and states it is manageable to control. There are dizzy spells that occur once in every 15 months.        Mr. Solomon presents today as a 67-Year-Old man for neurological follow up. 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 He denies any new or evolving symptoms. This has been going on for approximately 3 years. Tremor do not occur all the time, only occurs when he is writing. His handwriting is legible and is not very bothersome. He denies any gait issues or cognitive issues. Since last encounter, patient continues to have head twitching that mostly occurs at night. He denies it being voluntary or an urge to do. Since last encounter.

## 2024-03-07 ENCOUNTER — APPOINTMENT (OUTPATIENT)
Dept: ORTHOPEDIC SURGERY | Facility: CLINIC | Age: 69
End: 2024-03-07
Payer: MEDICARE

## 2024-03-07 VITALS — BODY MASS INDEX: 18.9 KG/M2 | HEIGHT: 71 IN | WEIGHT: 135 LBS

## 2024-03-07 DIAGNOSIS — S46.912A STRAIN OF UNSPECIFIED MUSCLE, FASCIA AND TENDON AT SHOULDER AND UPPER ARM LEVEL, LEFT ARM, INITIAL ENCOUNTER: ICD-10-CM

## 2024-03-07 PROCEDURE — 73030 X-RAY EXAM OF SHOULDER: CPT | Mod: LT

## 2024-03-07 PROCEDURE — 99203 OFFICE O/P NEW LOW 30 MIN: CPT

## 2024-03-07 NOTE — HISTORY OF PRESENT ILLNESS
[de-identified] : This an acute exacerbation of a chronic problem for the left shoulder with pain he had been doing home exercise that resolved somewhat he denies any allergies currently taking levodopa and statin and a baby aspirin carbidopa  On exam is got full range of motion no bicep deformity some pain with [place]'s less pain with cuff resistance and impingement  X-rays taken today 3 views in the office show no soft tissue calcifications no glenohumeral joint arthritis   diagnosis is resolving SLAP tear  Recommended to therapy then convert to home program currently right now I do not think he is symptomatic enough for anti-inflammatories or injections he will follow-up as needed

## 2024-03-25 ENCOUNTER — OUTPATIENT (OUTPATIENT)
Dept: OUTPATIENT SERVICES | Facility: HOSPITAL | Age: 69
LOS: 1 days | End: 2024-03-25
Payer: MEDICARE

## 2024-03-25 DIAGNOSIS — C61 MALIGNANT NEOPLASM OF PROSTATE: ICD-10-CM

## 2024-03-25 DIAGNOSIS — Z86.69 PERSONAL HISTORY OF OTHER DISEASES OF THE NERVOUS SYSTEM AND SENSE ORGANS: Chronic | ICD-10-CM

## 2024-03-25 PROCEDURE — 72197 MRI PELVIS W/O & W/DYE: CPT | Mod: 26

## 2024-03-25 PROCEDURE — 72197 MRI PELVIS W/O & W/DYE: CPT

## 2024-03-25 PROCEDURE — A9579: CPT

## 2024-03-26 DIAGNOSIS — C61 MALIGNANT NEOPLASM OF PROSTATE: ICD-10-CM

## 2024-03-28 ENCOUNTER — LABORATORY RESULT (OUTPATIENT)
Age: 69
End: 2024-03-28

## 2024-03-28 ENCOUNTER — APPOINTMENT (OUTPATIENT)
Dept: UROLOGY | Facility: CLINIC | Age: 69
End: 2024-03-28
Payer: MEDICARE

## 2024-03-28 DIAGNOSIS — R82.71 BACTERIURIA: ICD-10-CM

## 2024-03-28 DIAGNOSIS — C61 MALIGNANT NEOPLASM OF PROSTATE: ICD-10-CM

## 2024-03-28 DIAGNOSIS — R35.0 FREQUENCY OF MICTURITION: ICD-10-CM

## 2024-03-28 PROCEDURE — G2211 COMPLEX E/M VISIT ADD ON: CPT

## 2024-03-28 PROCEDURE — 99214 OFFICE O/P EST MOD 30 MIN: CPT | Mod: 25

## 2024-03-28 PROCEDURE — 81003 URINALYSIS AUTO W/O SCOPE: CPT | Mod: QW

## 2024-03-28 NOTE — ASSESSMENT
[FreeTextEntry1] : IRASEMA BOYCE is a 68-year-old male who presents for f/u of an elevated PSA. No risk factors. With asymptomatic bacteriuria - s/p 7 days of Bactrim- urine neg for infection now, no PVR demonstrated w persistently elevated PSA and MRI negative w high PSAD sp TP prostate biopsy (non-targeted). Pathology Sariah 3+3 prostate cancer 3 of 12 cores positive up to 90% of core  PCa on AS Mild chronic non bothersome LUTS especially urinary frequency - UA, UCx today  - plan for TP fusion prostate targeted biopsy  - f/u 2 weeks after to discuss results.     The patient understands that PSA is a marker of cancer risk but does not diagnose cancer. The patient also understands that there are a number of benign conditions including UTI, BPH, certain activities and prostatitis that will increase PSA in the absence of cancer. Unfortunately the only way to definitively diagnose cancer is with a prostate biopsy. We discussed the method of performing biopsy (transrectal or transperineal) and the risks (bleeding, infection/sepsis, urinary retention, ED). We perform targeted fusion prostate biopsies in the operating room under anesthesia.

## 2024-03-28 NOTE — HISTORY OF PRESENT ILLNESS
[FreeTextEntry1] : IRASEMA BOYCE is a 68-year-old male who presents for f/u of an elevated PSA. No risk factors. With asymptomatic bacteriuria - s/p 7 days of Bactrim- urine neg for infection now, no PVR demonstrated w persistently elevated PSA and MRI negative w high PSAD sp TP prostate biopsy (non-targeted). Pathology Sariah 3+3 prostate cancer 3 of 12 cores positive up to 90% of core  Pt reports urinary frequency throughout the day and nocturia 2x. He reports good stream and states he passes gas when he voids. Denies flank pain, gross hematuria, dysuria or associated symptoms.   MR Prostate 03/25/2024 - images independently reviewed by me -agree with findings suspicious area on DWI right PZ.  No lymphadenopathy no seminal vesicle invasion. IMPRESSION: Prostate lesion as detailed above. PIRADS 4 - High (clinically significant cancer is likely to be present) Prostate size 25 g  PSA 3/2024 5.8 PSAD 0.232  previously Most recent PSA September 2023 is 6.7 ng/mL.  PSA in March 2023 was 6.8 ng/mL. urine dip today 7/21/22- neg bladder scan today 7/21/22- 36ml  PSA- 7/2022- 6.5 %free 6 PSAD- 0.26 PSA- 5/2022- 5.9 Free psa 8% PSAD 0.231  MRI prostate- 5/24/22 images demonstrate no suspicious lesions, PI-RADS 1 and prostate volume 25g  4/2022:  UCx= E.Coli UA:WBC=20-40 // bacteria=many.  PSA=6.5  Denies  PMH including previous kidney stones, recurrent UTIs. Family History: No  malignancies Social History: Current Cigar Smoker. former  with [employer], brother passed recently,   Labs: 12/2021: UA: pyuria // no microhematuria// nitrates=negative Cr= 0.94 eGFR=84 PSA=7.4  5/2021: PSA= 6.6  AUA: 2 Quality of life= delighted.

## 2024-04-03 RX ORDER — SULFAMETHOXAZOLE AND TRIMETHOPRIM 800; 160 MG/1; MG/1
800-160 TABLET ORAL
Qty: 14 | Refills: 0 | Status: ACTIVE | COMMUNITY
Start: 2024-04-03 | End: 1900-01-01

## 2024-04-12 ENCOUNTER — NON-APPOINTMENT (OUTPATIENT)
Age: 69
End: 2024-04-12

## 2024-04-12 LAB
BILIRUB UR QL STRIP: NORMAL
COLLECTION METHOD: NORMAL
GLUCOSE UR-MCNC: NORMAL
HCG UR QL: 0.2 EU/DL
HGB UR QL STRIP.AUTO: NORMAL
KETONES UR-MCNC: NORMAL
LEUKOCYTE ESTERASE UR QL STRIP: NORMAL
NITRITE UR QL STRIP: NORMAL
PH UR STRIP: 6.5
PROT UR STRIP-MCNC: NORMAL
SP GR UR STRIP: 1.01

## 2024-05-23 ENCOUNTER — APPOINTMENT (OUTPATIENT)
Dept: NEUROLOGY | Facility: CLINIC | Age: 69
End: 2024-05-23

## 2024-06-06 ENCOUNTER — APPOINTMENT (OUTPATIENT)
Dept: NEUROLOGY | Facility: CLINIC | Age: 69
End: 2024-06-06

## 2024-06-19 ENCOUNTER — APPOINTMENT (OUTPATIENT)
Dept: NEUROLOGY | Facility: CLINIC | Age: 69
End: 2024-06-19
Payer: MEDICARE

## 2024-06-19 VITALS — BODY MASS INDEX: 18.2 KG/M2 | WEIGHT: 130 LBS | HEIGHT: 71 IN

## 2024-06-19 VITALS — SYSTOLIC BLOOD PRESSURE: 99 MMHG | DIASTOLIC BLOOD PRESSURE: 65 MMHG | HEART RATE: 114 BPM

## 2024-06-19 DIAGNOSIS — G24.9 DYSTONIA, UNSPECIFIED: ICD-10-CM

## 2024-06-19 DIAGNOSIS — Z86.73 PERSONAL HISTORY OF TRANSIENT ISCHEMIC ATTACK (TIA), AND CEREBRAL INFARCTION W/OUT RESIDUAL DEFICITS: ICD-10-CM

## 2024-06-19 PROCEDURE — 99213 OFFICE O/P EST LOW 20 MIN: CPT

## 2024-06-19 NOTE — ASSESSMENT
[FreeTextEntry1] : Task specific dystonia - Continue with carbidopa/levodopa 25/250 mg 3 times daily.  Patient was told that he could try 2 tablet at the time but he has to make sure that he eats before hand since he had side effect when taking on an empty stomach. -Follow-up with the movement disorder specialist at [place] when able   History of CVA - Continue with aspirin/atorvastatin for secondary stroke prevention

## 2024-06-19 NOTE — HISTORY OF PRESENT ILLNESS
[FreeTextEntry1] : Mr. IRASEMA BOYCE returns for a follow up. His prior history, physical have been reviewed. He reports no change since last visit. He has been seeing us for Dystocia in the left hand which has been controlled with carbidopa/levodopa 25/200 mg twice daily.  He has tried Sinemet once without food which caused nausea but when he takes it with food, no side effect from the medication.  Additionally he has a history of CVA and has been on aspirin and statin for secondary stroke prevention.  No complaints today  He was recommended to follow-up with a movement disorder specialist in the city, specifically at [place] which he has not done.

## 2024-10-14 ENCOUNTER — OUTPATIENT (OUTPATIENT)
Dept: OUTPATIENT SERVICES | Facility: HOSPITAL | Age: 69
LOS: 1 days | End: 2024-10-14
Payer: MEDICARE

## 2024-10-14 ENCOUNTER — RESULT REVIEW (OUTPATIENT)
Age: 69
End: 2024-10-14

## 2024-10-14 DIAGNOSIS — Z86.69 PERSONAL HISTORY OF OTHER DISEASES OF THE NERVOUS SYSTEM AND SENSE ORGANS: Chronic | ICD-10-CM

## 2024-10-14 DIAGNOSIS — N42.89 OTHER SPECIFIED DISORDERS OF PROSTATE: ICD-10-CM

## 2024-10-14 PROCEDURE — 76377 3D RENDER W/INTRP POSTPROCES: CPT

## 2024-10-14 PROCEDURE — 76377 3D RENDER W/INTRP POSTPROCES: CPT | Mod: 26

## 2024-10-15 ENCOUNTER — NON-APPOINTMENT (OUTPATIENT)
Age: 69
End: 2024-10-15

## 2024-10-15 DIAGNOSIS — N42.89 OTHER SPECIFIED DISORDERS OF PROSTATE: ICD-10-CM

## 2024-10-24 ENCOUNTER — OUTPATIENT (OUTPATIENT)
Dept: OUTPATIENT SERVICES | Facility: HOSPITAL | Age: 69
LOS: 1 days | End: 2024-10-24
Payer: MEDICARE

## 2024-10-24 VITALS
SYSTOLIC BLOOD PRESSURE: 134 MMHG | WEIGHT: 130.07 LBS | TEMPERATURE: 98 F | DIASTOLIC BLOOD PRESSURE: 76 MMHG | RESPIRATION RATE: 18 BRPM | HEART RATE: 84 BPM | HEIGHT: 71 IN | OXYGEN SATURATION: 98 %

## 2024-10-24 DIAGNOSIS — Z86.69 PERSONAL HISTORY OF OTHER DISEASES OF THE NERVOUS SYSTEM AND SENSE ORGANS: Chronic | ICD-10-CM

## 2024-10-24 DIAGNOSIS — Z01.818 ENCOUNTER FOR OTHER PREPROCEDURAL EXAMINATION: ICD-10-CM

## 2024-10-24 DIAGNOSIS — R97.20 ELEVATED PROSTATE SPECIFIC ANTIGEN [PSA]: ICD-10-CM

## 2024-10-24 PROBLEM — R42 DIZZINESS AND GIDDINESS: Chronic | Status: INACTIVE | Noted: 2022-08-25 | Resolved: 2024-10-24

## 2024-10-24 LAB
ALBUMIN SERPL ELPH-MCNC: 4.5 G/DL — SIGNIFICANT CHANGE UP (ref 3.5–5.2)
ALP SERPL-CCNC: 64 U/L — SIGNIFICANT CHANGE UP (ref 30–115)
ALT FLD-CCNC: 10 U/L — SIGNIFICANT CHANGE UP (ref 0–41)
ANION GAP SERPL CALC-SCNC: 9 MMOL/L — SIGNIFICANT CHANGE UP (ref 7–14)
APPEARANCE UR: CLEAR — SIGNIFICANT CHANGE UP
AST SERPL-CCNC: 16 U/L — SIGNIFICANT CHANGE UP (ref 0–41)
BACTERIA # UR AUTO: ABNORMAL /HPF
BASOPHILS # BLD AUTO: 0.04 K/UL — SIGNIFICANT CHANGE UP (ref 0–0.2)
BASOPHILS NFR BLD AUTO: 0.6 % — SIGNIFICANT CHANGE UP (ref 0–1)
BILIRUB SERPL-MCNC: 1 MG/DL — SIGNIFICANT CHANGE UP (ref 0.2–1.2)
BILIRUB UR-MCNC: NEGATIVE — SIGNIFICANT CHANGE UP
BUN SERPL-MCNC: 15 MG/DL — SIGNIFICANT CHANGE UP (ref 10–20)
CALCIUM SERPL-MCNC: 10 MG/DL — SIGNIFICANT CHANGE UP (ref 8.4–10.5)
CAST: 0 /LPF — SIGNIFICANT CHANGE UP (ref 0–4)
CHLORIDE SERPL-SCNC: 100 MMOL/L — SIGNIFICANT CHANGE UP (ref 98–110)
CO2 SERPL-SCNC: 29 MMOL/L — SIGNIFICANT CHANGE UP (ref 17–32)
COLOR SPEC: YELLOW — SIGNIFICANT CHANGE UP
CREAT SERPL-MCNC: 1.1 MG/DL — SIGNIFICANT CHANGE UP (ref 0.7–1.5)
DIFF PNL FLD: NEGATIVE — SIGNIFICANT CHANGE UP
EGFR: 73 ML/MIN/1.73M2 — SIGNIFICANT CHANGE UP
EOSINOPHIL # BLD AUTO: 0.14 K/UL — SIGNIFICANT CHANGE UP (ref 0–0.7)
EOSINOPHIL NFR BLD AUTO: 2 % — SIGNIFICANT CHANGE UP (ref 0–8)
GLUCOSE SERPL-MCNC: 82 MG/DL — SIGNIFICANT CHANGE UP (ref 70–99)
GLUCOSE UR QL: NEGATIVE MG/DL — SIGNIFICANT CHANGE UP
HCT VFR BLD CALC: 42.1 % — SIGNIFICANT CHANGE UP (ref 42–52)
HGB BLD-MCNC: 13.3 G/DL — LOW (ref 14–18)
IMM GRANULOCYTES NFR BLD AUTO: 0.1 % — SIGNIFICANT CHANGE UP (ref 0.1–0.3)
KETONES UR-MCNC: NEGATIVE MG/DL — SIGNIFICANT CHANGE UP
LEUKOCYTE ESTERASE UR-ACNC: ABNORMAL
LYMPHOCYTES # BLD AUTO: 1.62 K/UL — SIGNIFICANT CHANGE UP (ref 1.2–3.4)
LYMPHOCYTES # BLD AUTO: 22.6 % — SIGNIFICANT CHANGE UP (ref 20.5–51.1)
MCHC RBC-ENTMCNC: 23.4 PG — LOW (ref 27–31)
MCHC RBC-ENTMCNC: 31.6 G/DL — LOW (ref 32–37)
MCV RBC AUTO: 74 FL — LOW (ref 80–94)
MONOCYTES # BLD AUTO: 1 K/UL — HIGH (ref 0.1–0.6)
MONOCYTES NFR BLD AUTO: 14 % — HIGH (ref 1.7–9.3)
NEUTROPHILS # BLD AUTO: 4.35 K/UL — SIGNIFICANT CHANGE UP (ref 1.4–6.5)
NEUTROPHILS NFR BLD AUTO: 60.7 % — SIGNIFICANT CHANGE UP (ref 42.2–75.2)
NITRITE UR-MCNC: NEGATIVE — SIGNIFICANT CHANGE UP
NRBC # BLD: 0 /100 WBCS — SIGNIFICANT CHANGE UP (ref 0–0)
PH UR: 6.5 — SIGNIFICANT CHANGE UP (ref 5–8)
PLATELET # BLD AUTO: 269 K/UL — SIGNIFICANT CHANGE UP (ref 130–400)
PMV BLD: 12.6 FL — HIGH (ref 7.4–10.4)
POTASSIUM SERPL-MCNC: 4.6 MMOL/L — SIGNIFICANT CHANGE UP (ref 3.5–5)
POTASSIUM SERPL-SCNC: 4.6 MMOL/L — SIGNIFICANT CHANGE UP (ref 3.5–5)
PROT SERPL-MCNC: 6.8 G/DL — SIGNIFICANT CHANGE UP (ref 6–8)
PROT UR-MCNC: NEGATIVE MG/DL — SIGNIFICANT CHANGE UP
RBC # BLD: 5.69 M/UL — SIGNIFICANT CHANGE UP (ref 4.7–6.1)
RBC # FLD: 14.7 % — HIGH (ref 11.5–14.5)
RBC CASTS # UR COMP ASSIST: 1 /HPF — SIGNIFICANT CHANGE UP (ref 0–4)
SODIUM SERPL-SCNC: 138 MMOL/L — SIGNIFICANT CHANGE UP (ref 135–146)
SP GR SPEC: 1.01 — SIGNIFICANT CHANGE UP (ref 1–1.03)
SQUAMOUS # UR AUTO: 0 /HPF — SIGNIFICANT CHANGE UP (ref 0–5)
UROBILINOGEN FLD QL: 0.2 MG/DL — SIGNIFICANT CHANGE UP (ref 0.2–1)
WBC # BLD: 7.16 K/UL — SIGNIFICANT CHANGE UP (ref 4.8–10.8)
WBC # FLD AUTO: 7.16 K/UL — SIGNIFICANT CHANGE UP (ref 4.8–10.8)
WBC UR QL: 144 /HPF — HIGH (ref 0–5)

## 2024-10-24 PROCEDURE — 80053 COMPREHEN METABOLIC PANEL: CPT

## 2024-10-24 PROCEDURE — 36415 COLL VENOUS BLD VENIPUNCTURE: CPT

## 2024-10-24 PROCEDURE — 87186 SC STD MICRODIL/AGAR DIL: CPT

## 2024-10-24 PROCEDURE — 87086 URINE CULTURE/COLONY COUNT: CPT

## 2024-10-24 PROCEDURE — 85025 COMPLETE CBC W/AUTO DIFF WBC: CPT

## 2024-10-24 PROCEDURE — 81001 URINALYSIS AUTO W/SCOPE: CPT

## 2024-10-24 PROCEDURE — 99214 OFFICE O/P EST MOD 30 MIN: CPT | Mod: 25

## 2024-10-24 NOTE — H&P PST ADULT - NSICDXPASTMEDICALHX_GEN_ALL_CORE_FT
PAST MEDICAL HISTORY:  BPH (benign prostatic hyperplasia)     GERD (gastroesophageal reflux disease)     Mini stroke winter 2021    Tremor arm- LT hand and shaking of  head -laying down at night

## 2024-10-24 NOTE — H&P PST ADULT - HISTORY OF PRESENT ILLNESS
69 y.o. M with a hx of BPH, GERD, mini stroke (2021), and left hand tremors presents for TRANSPERINEAL FUSION PROSTATE BIOPSY. He admits he had elevated PSA so had a MRI of prostate on 10/14/2024 which showed a lesion. He denies any CP, SOB, headache, dizziness, fever, dysuria, hematuria, lower abdominal pain, polyuria, urinary frequency, and urinary retention. PATIENT/GUARDIAN CURRENTLY DENIES CHEST PAIN  SHORTNESS OF BREATH  PALPITATIONS,  DYSURIA, OR UPPER RESPIRATORY INFECTION IN PAST 2 WEEKS  denies travel outside the [place] in the past 30 days    Anesthesia Alert  YES--Difficult Airway +4  NO--History of neck surgery or radiation  NO--Limited ROM of neck  NO--History of Malignant hyperthermia  NO--No personal or family history of Pseudocholinesterase deficiency.  NO--Prior Anesthesia Complication  NO--Latex Allergy  NO--Loose teeth  NO--History of Rheumatoid Arthritis  NO--Bleeding risk  NO--LESTER  NO--Other_____    PT/GUARDIAN DENIES ANY RASHES, ABRASION, OR OPEN WOUNDS OR CUTS    AS PER THE PT/GUARDIAN, THIS IS HIS/HER COMPLETE MEDICAL AND SURGICAL HX, INCLUDING MEDICATIONS PRESCRIBED AND OVER THE COUNTER    Patient/guardian understands the instructions and was given the opportunity to ask questions and have them answered.    pt/guardian denies any suicidal ideation or thoughts, pt states not a threat to self or others      Duke Activity Status Index (DASI)  Duke Activity Status Index (DASI) from [employer]  on 10/24/2024  ** All calculations should be rechecked by clinician prior to use **    RESULT SUMMARY:  58.2 points  The higher the score (maximum 58.2), the higher the functional status.    9.89 METs        INPUTS:  Take care of self —> 2.75 = Yes  Walk indoors —> 1.75 = Yes  Walk 1&ndash;2 blocks on level ground —> 2.75 = Yes  Climb a flight of stairs or walk up a hill —> 5.5 = Yes  Run a short distance —> 8 = Yes  Do light work around the house —> 2.7 = Yes  Do moderate work around the house —> 3.5 = Yes  Do heavy work around the house —> 8 = Yes  Do yardwork —> 4.5 = Yes  Have sexual relations —> 5.25 = Yes  Participate in moderate recreational activities —> 6 = Yes  Participate in strenuous sports —> 7.5 = Yes        Revised Cardiac Risk Index for Pre-Operative Risk  Revised Cardiac Risk Index for Pre-Operative Risk from [employer]  on 10/24/2024  ** All calculations should be rechecked by clinician prior to use **    RESULT SUMMARY:  0 points  Class I Risk    3.9 %  30-day risk of death, MI, or cardiac arrest    From [employer] 2017. These numbers are higher than those from the original study (Norm 1999). See Evidence for details.      INPUTS:  Elevated-risk surgery —> 0 = No  History of ischemic heart disease —> 0 = No  History of congestive heart failure —> 0 = No  History of cerebrovascular disease —> 0 = No  Pre-operative treatment with insulin —> 0 = No  Pre-operative creatinine >2 mg/dL / 176.8 µmol/L —> 0 = No      Opioid Risk Assessment Tool (Male)       Family history of substance abuse            Alcohol (3)            Illegal Drugs (3)            Prescription drugs (4)       Personal history of substance abuse            Alcohol (3)            Illegal Drugs (4)            Prescription drugs (5)       Age between 16-45 (1)       History of preadolescent sexual abuse (0)       Psychological disease (ADD, ADHD, OCD, Bipolar Disorder, Schizophrenia, Depression) (1)    Scoring Totals:  Low Risk (0-3)  Moderate Risk (4-7)  High Risk (>/=8)  SCORE: 0

## 2024-10-24 NOTE — H&P PST ADULT - ANESTHESIA, PREVIOUS REACTION, PROFILE
patient states he can only have general anesthesia because when he had a prostate bx in the past (1-2 years ago) he had constipation for 10 days after twilight

## 2024-10-24 NOTE — H&P PST ADULT - ATTENDING COMMENTS
For transperineal fusion biopsy prostate.  risks ,benefits, alternatives discussed including pain, infection, bleeding, urinary retention. For transperineal fusion biopsy prostate.  risks ,benefits, alternatives discussed including pain, infection, bleeding, urinary retention. Bacteruria treated as outpt.

## 2024-10-25 DIAGNOSIS — R97.20 ELEVATED PROSTATE SPECIFIC ANTIGEN [PSA]: ICD-10-CM

## 2024-10-25 DIAGNOSIS — Z01.818 ENCOUNTER FOR OTHER PREPROCEDURAL EXAMINATION: ICD-10-CM

## 2024-10-29 RX ORDER — CEFPODOXIME PROXETIL 200 MG/1
200 TABLET, FILM COATED ORAL
Qty: 14 | Refills: 0 | Status: ACTIVE | COMMUNITY
Start: 2024-10-29 | End: 1900-01-01

## 2024-11-07 ENCOUNTER — RESULT REVIEW (OUTPATIENT)
Age: 69
End: 2024-11-07

## 2024-11-07 ENCOUNTER — TRANSCRIPTION ENCOUNTER (OUTPATIENT)
Age: 69
End: 2024-11-07

## 2024-11-07 ENCOUNTER — APPOINTMENT (OUTPATIENT)
Dept: UROLOGY | Facility: HOSPITAL | Age: 69
End: 2024-11-07

## 2024-11-07 ENCOUNTER — OUTPATIENT (OUTPATIENT)
Dept: OUTPATIENT SERVICES | Facility: HOSPITAL | Age: 69
LOS: 1 days | Discharge: HOME | End: 2024-11-07
Payer: MEDICARE

## 2024-11-07 VITALS
RESPIRATION RATE: 17 BRPM | HEART RATE: 111 BPM | HEIGHT: 71 IN | TEMPERATURE: 98 F | WEIGHT: 130.07 LBS | OXYGEN SATURATION: 100 % | DIASTOLIC BLOOD PRESSURE: 68 MMHG | SYSTOLIC BLOOD PRESSURE: 122 MMHG

## 2024-11-07 VITALS — HEART RATE: 82 BPM | SYSTOLIC BLOOD PRESSURE: 110 MMHG | RESPIRATION RATE: 18 BRPM | DIASTOLIC BLOOD PRESSURE: 62 MMHG

## 2024-11-07 DIAGNOSIS — C61 MALIGNANT NEOPLASM OF PROSTATE: ICD-10-CM

## 2024-11-07 DIAGNOSIS — Z86.69 PERSONAL HISTORY OF OTHER DISEASES OF THE NERVOUS SYSTEM AND SENSE ORGANS: Chronic | ICD-10-CM

## 2024-11-07 DIAGNOSIS — R97.20 ELEVATED PROSTATE SPECIFIC ANTIGEN [PSA]: ICD-10-CM

## 2024-11-07 DIAGNOSIS — Z86.73 PERSONAL HISTORY OF TRANSIENT ISCHEMIC ATTACK (TIA), AND CEREBRAL INFARCTION WITHOUT RESIDUAL DEFICITS: ICD-10-CM

## 2024-11-07 DIAGNOSIS — Z79.82 LONG TERM (CURRENT) USE OF ASPIRIN: ICD-10-CM

## 2024-11-07 PROCEDURE — 55700: CPT

## 2024-11-07 PROCEDURE — G0416: CPT | Mod: 26

## 2024-11-07 PROCEDURE — G0416: CPT

## 2024-11-07 PROCEDURE — 76999F: CUSTOM | Mod: 26

## 2024-11-07 PROCEDURE — 93975 VASCULAR STUDY: CPT | Mod: 26

## 2024-11-07 RX ORDER — HYDROMORPHONE HCL/0.9% NACL/PF 6 MG/30 ML
0.5 PATIENT CONTROLLED ANALGESIA SYRINGE INTRAVENOUS
Refills: 0 | Status: DISCONTINUED | OUTPATIENT
Start: 2024-11-07 | End: 2024-11-07

## 2024-11-07 RX ORDER — ACETAMINOPHEN 500 MG
650 TABLET ORAL ONCE
Refills: 0 | Status: DISCONTINUED | OUTPATIENT
Start: 2024-11-07 | End: 2024-11-07

## 2024-11-07 RX ORDER — ONDANSETRON HYDROCHLORIDE 2 MG/ML
4 INJECTION, SOLUTION INTRAMUSCULAR; INTRAVENOUS ONCE
Refills: 0 | Status: DISCONTINUED | OUTPATIENT
Start: 2024-11-07 | End: 2024-11-07

## 2024-11-07 RX ORDER — MORPHINE SULFATE 30 MG/1
2 TABLET, EXTENDED RELEASE ORAL
Refills: 0 | Status: DISCONTINUED | OUTPATIENT
Start: 2024-11-07 | End: 2024-11-07

## 2024-11-07 NOTE — ASU PATIENT PROFILE, ADULT - BRADEN SCORE (IF 18 OR LESS ACTIVATE SKIN INJURY RISK INCREASED GUIDELINE), MLM

## 2024-11-07 NOTE — ASU DISCHARGE PLAN (ADULT/PEDIATRIC) - CARE PROVIDER_API CALL
Deandre Brambila  Urology  [address] [place] [id number]  Phone: [phone]  Fax: [phone]  Follow Up Time:

## 2024-11-07 NOTE — CHART NOTE - NSCHARTNOTEFT_GEN_A_CORE
PACU ANESTHESIA ADMISSION NOTE      Procedure:transperineal fusion prostate biopsy    ____  Intubated  TV:______       Rate: ______      FiO2: ______    ___x_  Patent Airway    __x__  Full return of protective reflexes    __x__  Full recovery from anesthesia / back to baseline     Vitals:   T:     97.7    R:   14          BP:   101/54              Sat:        100           P: 80      Mental Status:  __Awake   _____ Alert   ___x__ Drowsy   _____ Sedated    Nausea/Vomiting:  __x__ NO  ______Yes,   See Post - Op Orders          Pain Scale (0-10):  _0____    Treatment: ____ None    ____ See Post - Op/PCA Orders    Post - Operative Fluids:   ____ Oral   ___x_ See Post - Op Orders    Plan: Discharge:   __x__Home       _____Floor     _____Critical Care    _____  Other:_________________    Comments:

## 2024-11-07 NOTE — PRE-ANESTHESIA EVALUATION ADULT - NSANTHOSAYNRD_GEN_A_CORE
No. LETSER screening performed.  STOP BANG Legend: 0-2 = LOW Risk; 3-4 = INTERMEDIATE Risk; 5-8 = HIGH Risk

## 2024-11-07 NOTE — BRIEF OPERATIVE NOTE - NSICDXBRIEFPROCEDURE_GEN_ALL_CORE_FT
PROCEDURES:  Biopsy of prostate by transperineal approach with integrated magnetic resonance-ultrasound guidance 07-Nov-2024 16:05:33  Yamileht Pierce

## 2024-11-07 NOTE — ASU DISCHARGE PLAN (ADULT/PEDIATRIC) - FINANCIAL ASSISTANCE
[facility] provides services at a reduced cost to those who are determined to be eligible through [facility]’s financial assistance program. Information regarding [facility]’s financial assistance program can be found by going to [email or web address] or by calling [phone].

## 2024-11-07 NOTE — ASU DISCHARGE PLAN (ADULT/PEDIATRIC) - NS MD DC FALL RISK RISK
For information on Fall & Injury Prevention, visit: [email or web address] OR  [email or web address] OR  https://www.cdc.gov/steadi/patient.html

## 2024-11-08 ENCOUNTER — NON-APPOINTMENT (OUTPATIENT)
Age: 69
End: 2024-11-08

## 2024-11-08 PROBLEM — K21.9 GASTRO-ESOPHAGEAL REFLUX DISEASE WITHOUT ESOPHAGITIS: Chronic | Status: ACTIVE | Noted: 2024-10-24

## 2024-11-08 PROBLEM — N40.0 BENIGN PROSTATIC HYPERPLASIA WITHOUT LOWER URINARY TRACT SYMPTOMS: Chronic | Status: ACTIVE | Noted: 2024-10-24

## 2024-11-15 LAB — SURGICAL PATHOLOGY STUDY: SIGNIFICANT CHANGE UP

## 2024-11-21 ENCOUNTER — NON-APPOINTMENT (OUTPATIENT)
Age: 69
End: 2024-11-21

## 2024-11-21 ENCOUNTER — APPOINTMENT (OUTPATIENT)
Dept: UROLOGY | Facility: CLINIC | Age: 69
End: 2024-11-21
Payer: MEDICARE

## 2024-11-21 ENCOUNTER — LABORATORY RESULT (OUTPATIENT)
Age: 69
End: 2024-11-21

## 2024-11-21 DIAGNOSIS — R39.15 URGENCY OF URINATION: ICD-10-CM

## 2024-11-21 DIAGNOSIS — N40.1 BENIGN PROSTATIC HYPERPLASIA WITH LOWER URINARY TRACT SYMPMS: ICD-10-CM

## 2024-11-21 DIAGNOSIS — N13.8 BENIGN PROSTATIC HYPERPLASIA WITH LOWER URINARY TRACT SYMPMS: ICD-10-CM

## 2024-11-21 DIAGNOSIS — C61 MALIGNANT NEOPLASM OF PROSTATE: ICD-10-CM

## 2024-11-21 DIAGNOSIS — R35.0 FREQUENCY OF MICTURITION: ICD-10-CM

## 2024-11-21 LAB
BILIRUB UR QL STRIP: NORMAL
COLLECTION METHOD: NORMAL
GLUCOSE UR-MCNC: NORMAL
HCG UR QL: 0.2 EU/DL
HGB UR QL STRIP.AUTO: NORMAL
KETONES UR-MCNC: NORMAL
LEUKOCYTE ESTERASE UR QL STRIP: NORMAL
NITRITE UR QL STRIP: NORMAL
PH UR STRIP: 7
PROT UR STRIP-MCNC: NORMAL
SP GR UR STRIP: 1.01

## 2024-11-21 PROCEDURE — 99214 OFFICE O/P EST MOD 30 MIN: CPT

## 2024-11-21 PROCEDURE — G2211 COMPLEX E/M VISIT ADD ON: CPT

## 2024-11-26 ENCOUNTER — NON-APPOINTMENT (OUTPATIENT)
Age: 69
End: 2024-11-26

## 2024-11-26 LAB — URINE CYTOLOGY: NORMAL

## 2024-12-10 ENCOUNTER — NON-APPOINTMENT (OUTPATIENT)
Age: 69
End: 2024-12-10

## 2024-12-19 ENCOUNTER — APPOINTMENT (OUTPATIENT)
Dept: NEUROLOGY | Facility: CLINIC | Age: 69
End: 2024-12-19
Payer: MEDICARE

## 2024-12-19 VITALS — WEIGHT: 135 LBS | BODY MASS INDEX: 18.9 KG/M2 | HEIGHT: 71 IN

## 2024-12-19 VITALS — DIASTOLIC BLOOD PRESSURE: 70 MMHG | HEART RATE: 77 BPM | SYSTOLIC BLOOD PRESSURE: 126 MMHG

## 2024-12-19 DIAGNOSIS — Z86.73 PERSONAL HISTORY OF TRANSIENT ISCHEMIC ATTACK (TIA), AND CEREBRAL INFARCTION W/OUT RESIDUAL DEFICITS: ICD-10-CM

## 2024-12-19 DIAGNOSIS — G24.9 DYSTONIA, UNSPECIFIED: ICD-10-CM

## 2024-12-19 PROCEDURE — 99213 OFFICE O/P EST LOW 20 MIN: CPT

## 2024-12-19 RX ORDER — FAMOTIDINE 40 MG/1
40 TABLET, FILM COATED ORAL
Refills: 0 | Status: ACTIVE | COMMUNITY

## 2024-12-25 PROBLEM — F10.90 ALCOHOL USE: Status: ACTIVE | Noted: 2022-03-14

## 2025-05-27 ENCOUNTER — APPOINTMENT (OUTPATIENT)
Dept: UROLOGY | Facility: CLINIC | Age: 70
End: 2025-05-27
Payer: MEDICARE

## 2025-05-27 DIAGNOSIS — R82.71 BACTERIURIA: ICD-10-CM

## 2025-05-27 DIAGNOSIS — C61 MALIGNANT NEOPLASM OF PROSTATE: ICD-10-CM

## 2025-05-27 DIAGNOSIS — N13.8 BENIGN PROSTATIC HYPERPLASIA WITH LOWER URINARY TRACT SYMPMS: ICD-10-CM

## 2025-05-27 DIAGNOSIS — N40.1 BENIGN PROSTATIC HYPERPLASIA WITH LOWER URINARY TRACT SYMPMS: ICD-10-CM

## 2025-05-27 PROCEDURE — 99215 OFFICE O/P EST HI 40 MIN: CPT

## 2025-05-27 PROCEDURE — G2211 COMPLEX E/M VISIT ADD ON: CPT

## 2025-05-27 RX ORDER — ALFUZOSIN HYDROCHLORIDE 10 MG/1
10 TABLET, EXTENDED RELEASE ORAL
Qty: 90 | Refills: 3 | Status: ACTIVE | COMMUNITY
Start: 2025-05-27 | End: 1900-01-01

## 2025-06-26 ENCOUNTER — APPOINTMENT (OUTPATIENT)
Dept: NEUROLOGY | Facility: CLINIC | Age: 70
End: 2025-06-26
Payer: MEDICARE

## 2025-06-26 VITALS
WEIGHT: 135 LBS | SYSTOLIC BLOOD PRESSURE: 110 MMHG | HEART RATE: 84 BPM | BODY MASS INDEX: 18.9 KG/M2 | HEIGHT: 71 IN | DIASTOLIC BLOOD PRESSURE: 64 MMHG

## 2025-06-26 PROCEDURE — 99213 OFFICE O/P EST LOW 20 MIN: CPT

## 2025-06-26 RX ORDER — MULTIVITAMIN
TABLET ORAL
Refills: 0 | Status: ACTIVE | COMMUNITY

## 2025-06-26 RX ORDER — CHOLECALCIFEROL (VITAMIN D3) 25 MCG
TABLET ORAL
Refills: 0 | Status: ACTIVE | COMMUNITY

## 2025-06-26 RX ORDER — ASPIRIN 81 MG
81 TABLET,CHEWABLE ORAL
Refills: 0 | Status: ACTIVE | COMMUNITY

## 2025-07-11 ENCOUNTER — OUTPATIENT (OUTPATIENT)
Dept: OUTPATIENT SERVICES | Facility: HOSPITAL | Age: 70
LOS: 1 days | End: 2025-07-11
Payer: MEDICARE

## 2025-07-11 ENCOUNTER — RESULT REVIEW (OUTPATIENT)
Age: 70
End: 2025-07-11

## 2025-07-11 DIAGNOSIS — Z86.69 PERSONAL HISTORY OF OTHER DISEASES OF THE NERVOUS SYSTEM AND SENSE ORGANS: Chronic | ICD-10-CM

## 2025-07-11 DIAGNOSIS — C61 MALIGNANT NEOPLASM OF PROSTATE: ICD-10-CM

## 2025-07-11 PROCEDURE — 72197 MRI PELVIS W/O & W/DYE: CPT | Mod: 26

## 2025-07-11 PROCEDURE — 72197 MRI PELVIS W/O & W/DYE: CPT

## 2025-07-11 PROCEDURE — A9579: CPT

## 2025-07-12 DIAGNOSIS — C61 MALIGNANT NEOPLASM OF PROSTATE: ICD-10-CM

## 2025-07-14 ENCOUNTER — NON-APPOINTMENT (OUTPATIENT)
Age: 70
End: 2025-07-14

## 2025-07-15 ENCOUNTER — APPOINTMENT (OUTPATIENT)
Dept: UROLOGY | Facility: CLINIC | Age: 70
End: 2025-07-15
Payer: MEDICARE

## 2025-07-15 PROCEDURE — 99215 OFFICE O/P EST HI 40 MIN: CPT

## 2025-07-15 PROCEDURE — G2211 COMPLEX E/M VISIT ADD ON: CPT

## 2025-07-25 ENCOUNTER — APPOINTMENT (OUTPATIENT)
Dept: RADIATION ONCOLOGY | Facility: HOSPITAL | Age: 70
End: 2025-07-25
Payer: MEDICARE

## 2025-07-25 ENCOUNTER — NON-APPOINTMENT (OUTPATIENT)
Age: 70
End: 2025-07-25

## 2025-07-25 VITALS
DIASTOLIC BLOOD PRESSURE: 60 MMHG | OXYGEN SATURATION: 99 % | TEMPERATURE: 97.8 F | HEART RATE: 103 BPM | HEIGHT: 71 IN | SYSTOLIC BLOOD PRESSURE: 103 MMHG | BODY MASS INDEX: 17.55 KG/M2 | WEIGHT: 125.38 LBS | RESPIRATION RATE: 16 BRPM

## 2025-07-25 DIAGNOSIS — C61 MALIGNANT NEOPLASM OF PROSTATE: ICD-10-CM

## 2025-07-25 DIAGNOSIS — Z60.2 PROBLEMS RELATED TO LIVING ALONE: ICD-10-CM

## 2025-07-25 PROCEDURE — 99205 OFFICE O/P NEW HI 60 MIN: CPT

## 2025-07-25 RX ORDER — TOCOPHERSOLAN (VITAMIN E TPGS) 400/15ML
LIQUID (ML) ORAL
Refills: 0 | Status: ACTIVE | COMMUNITY

## 2025-07-25 SDOH — SOCIAL STABILITY - SOCIAL INSECURITY: PROBLEMS RELATED TO LIVING ALONE: Z60.2
